# Patient Record
Sex: MALE | Race: WHITE | Employment: FULL TIME | URBAN - METROPOLITAN AREA
[De-identification: names, ages, dates, MRNs, and addresses within clinical notes are randomized per-mention and may not be internally consistent; named-entity substitution may affect disease eponyms.]

---

## 2019-06-13 ENCOUNTER — HOSPITAL ENCOUNTER (OUTPATIENT)
Dept: LAB | Age: 59
Discharge: HOME | End: 2019-06-13
Payer: SELF-PAY

## 2019-06-13 ENCOUNTER — OFFICE VISIT (OUTPATIENT)
Dept: FAMILY MEDICINE CLINIC | Age: 59
End: 2019-06-13

## 2019-06-13 VITALS
HEIGHT: 67 IN | WEIGHT: 216.6 LBS | RESPIRATION RATE: 16 BRPM | DIASTOLIC BLOOD PRESSURE: 84 MMHG | SYSTOLIC BLOOD PRESSURE: 116 MMHG | HEART RATE: 67 BPM | OXYGEN SATURATION: 97 % | BODY MASS INDEX: 34 KG/M2 | TEMPERATURE: 98.1 F

## 2019-06-13 DIAGNOSIS — R06.02 SOB (SHORTNESS OF BREATH) ON EXERTION: ICD-10-CM

## 2019-06-13 DIAGNOSIS — F17.200 SMOKER: ICD-10-CM

## 2019-06-13 DIAGNOSIS — R06.02 SOB (SHORTNESS OF BREATH) ON EXERTION: Primary | ICD-10-CM

## 2019-06-13 LAB
BASOPHILS # BLD: 0 K/UL (ref 0–0.1)
BASOPHILS NFR BLD: 0 % (ref 0–3)
DIFFERENTIAL METHOD BLD: ABNORMAL
EOSINOPHIL # BLD: 0.3 K/UL (ref 0–0.4)
EOSINOPHIL NFR BLD: 2 % (ref 0–5)
ERYTHROCYTE [DISTWIDTH] IN BLOOD BY AUTOMATED COUNT: 13.7 % (ref 11.6–14.5)
HCT VFR BLD AUTO: 46.8 % (ref 36–48)
HGB BLD-MCNC: 15.2 G/DL (ref 13–16)
LYMPHOCYTES # BLD: 3 K/UL (ref 0.8–3.5)
LYMPHOCYTES NFR BLD: 22 % (ref 20–51)
MCH RBC QN AUTO: 29.2 PG (ref 24–34)
MCHC RBC AUTO-ENTMCNC: 32.5 G/DL (ref 31–37)
MCV RBC AUTO: 90 FL (ref 74–97)
MONOCYTES # BLD: 0.9 K/UL (ref 0–1)
MONOCYTES NFR BLD: 7 % (ref 2–9)
NEUTS BAND NFR BLD MANUAL: 8 % (ref 0–5)
NEUTS SEG # BLD: 8.2 K/UL (ref 1.8–8)
NEUTS SEG NFR BLD: 61 % (ref 42–75)
PLATELET # BLD AUTO: 297 K/UL (ref 135–420)
PLATELET COMMENTS,PCOM: ADEQUATE
PMV BLD AUTO: 10.4 FL (ref 9.2–11.8)
RBC # BLD AUTO: 5.2 M/UL (ref 4.7–5.5)
RBC MORPH BLD: ABNORMAL
WBC # BLD AUTO: 13.5 K/UL (ref 4.6–13.2)

## 2019-06-13 PROCEDURE — 80061 LIPID PANEL: CPT

## 2019-06-13 PROCEDURE — 80053 COMPREHEN METABOLIC PANEL: CPT

## 2019-06-13 PROCEDURE — 85025 COMPLETE CBC W/AUTO DIFF WBC: CPT

## 2019-06-13 RX ORDER — FLUTICASONE PROPIONATE 220 UG/1
1 AEROSOL, METERED RESPIRATORY (INHALATION)
COMMUNITY
Start: 2019-05-28

## 2019-06-13 RX ORDER — ALBUTEROL SULFATE 90 UG/1
2 AEROSOL, METERED RESPIRATORY (INHALATION)
COMMUNITY
Start: 2019-01-08

## 2019-06-13 NOTE — PROGRESS NOTES
Patient states he works in [employer] sometimes and has noticed he always has breathing issues when he is there. He developes SOB.

## 2019-06-13 NOTE — PROGRESS NOTES
Bal Wiley is a 60 y.o. male  presents for follow up from [place]. He had SOB and was told to follow up with PCP. He quit smoking and since he returned home from [facility] he has no SOB. No Known Allergies  Outpatient Medications Marked as Taking for the 6/13/19 encounter (Office Visit) with Gilberto Smith MD   Medication Sig Dispense Refill    albuterol (PROVENTIL HFA, VENTOLIN HFA, PROAIR HFA) 90 mcg/actuation inhaler Take 2 Puffs by inhalation.  fluticasone propion/salmeterol (ADVAIR DISKUS IN) Take  by inhalation. 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    Elevated blood sugar R73.9    Tobacco use disorder F17.200    DDD (degenerative disc disease), lumbar M51.36    Bulging lumbar disc M51.26    Chronic back pain M54.9, G89.29    Muscle spasm M62.838    Lumbar foraminal stenosis M99.83    Bilateral sciatica M54.31, M54.32    Hyperinflation of lungs, CXR 1/2014 R09.89    Chronic lumbar pain M54.5, G89.29    Facet arthritis of lumbar region M47.816    Midline low back pain without sciatica M54.5    Encounter for long-term (current) use of medications Z79.899    Chronic pain G89.29    Lumbar degenerative disc disease M51.36    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        Social History     Socioeconomic History    Marital status:      Spouse name: Not on file    Number of children: Not on file    Years of education: Not on file    Highest education level: Not on file   Tobacco Use    Smoking status: Former Smoker     Packs/day: 1.00     Types: Cigarettes    Smokeless tobacco: Never Used   Substance and Sexual Activity    Alcohol use: No    Drug use: No     Family History   Problem Relation Age of Onset    Heart Attack Mother         MI    Coronary Artery Disease Other     Psychiatric Disorder Sister 68        alzheimer's    Arthritis-rheumatoid Maternal Uncle         2 uncles with disabling arthrits        Review of Systems   Constitutional: Negative for chills, fever, malaise/fatigue and weight loss. Eyes: Negative for blurred vision. Respiratory: Negative for shortness of breath and wheezing. Cardiovascular: Negative for chest pain. Gastrointestinal: Negative for nausea and vomiting. Musculoskeletal: Negative for myalgias. Skin: Negative for rash. Neurological: Negative for weakness. Vitals:    06/13/19 0825   BP: 116/84   Pulse: 67   Resp: 16   Temp: 98.1 °F (36.7 °C)   TempSrc: Oral   SpO2: 97%   Weight: 216 lb 9.6 oz (98.2 kg)   Height: 5' 7\" (1.702 m)   PainSc:   0 - No pain       Physical Exam   Constitutional: He is oriented to person, place, and time and well-developed, well-nourished, and in no distress. Neck: Normal range of motion. Neck supple. No thyromegaly present. Cardiovascular: Normal rate, regular rhythm and normal heart sounds. Pulmonary/Chest: Effort normal and breath sounds normal.   Musculoskeletal: Normal range of motion. Neurological: He is alert and oriented to person, place, and time. Gait normal.   Skin: Skin is warm and dry. Psychiatric: Mood, memory, affect and judgment normal.   Nursing note and vitals reviewed. Assessment/Plan      ICD-10-CM ICD-9-CM    1.  SOB (shortness of breath) on exertion R06.02 786.05 REFERRAL TO PULMONARY DISEASE      METABOLIC PANEL, COMPREHENSIVE      CBC WITH AUTOMATED DIFF      LIPID PANEL   2. Smoker F17.200 305.1      I have discussed the diagnosis with the patient and the intended plan of care as seen in the above orders. The patient has received an after-visit summary and questions were answered concerning future plans. I have discussed medication, side effects, and warnings with the patient in detail. The patient verbalized understanding and is in agreement with the plan of care. The patient will contact the office with any additional concerns. lab results and schedule of future lab studies reviewed with patient    Discussed the patient's BMI with him. The BMI follow up plan is as follows:     dietary management education, guidance, and counseling  encourage exercise  monitor weight  prescribed dietary intake    The patient was counseled on the dangers of tobacco use, and was advised to quit. Reviewed strategies to maximize success, including removing cigarettes and smoking materials from environment. An After Visit Summary was printed and given to the patient.     Enrique Alcantar MD

## 2019-06-14 LAB
ALBUMIN SERPL-MCNC: 3.9 G/DL (ref 3.4–5)
ALBUMIN/GLOB SERPL: 1.4 {RATIO} (ref 0.8–1.7)
ALP SERPL-CCNC: 62 U/L (ref 45–117)
ALT SERPL-CCNC: 45 U/L (ref 16–61)
ANION GAP SERPL CALC-SCNC: 11 MMOL/L (ref 3–18)
AST SERPL-CCNC: 15 U/L (ref 15–37)
BILIRUB SERPL-MCNC: 1.1 MG/DL (ref 0.2–1)
BUN SERPL-MCNC: 19 MG/DL (ref 7–18)
BUN/CREAT SERPL: 22 (ref 12–20)
CALCIUM SERPL-MCNC: 8.9 MG/DL (ref 8.5–10.1)
CHLORIDE SERPL-SCNC: 111 MMOL/L (ref 100–108)
CHOLEST SERPL-MCNC: 187 MG/DL
CO2 SERPL-SCNC: 19 MMOL/L (ref 21–32)
CREAT SERPL-MCNC: 0.87 MG/DL (ref 0.6–1.3)
GLOBULIN SER CALC-MCNC: 2.8 G/DL (ref 2–4)
GLUCOSE SERPL-MCNC: 103 MG/DL (ref 74–99)
HDLC SERPL-MCNC: 82 MG/DL (ref 40–60)
HDLC SERPL: 2.3 {RATIO} (ref 0–5)
LDLC SERPL CALC-MCNC: 90.4 MG/DL (ref 0–100)
LIPID PROFILE,FLP: ABNORMAL
POTASSIUM SERPL-SCNC: 4.7 MMOL/L (ref 3.5–5.5)
PROT SERPL-MCNC: 6.7 G/DL (ref 6.4–8.2)
SODIUM SERPL-SCNC: 141 MMOL/L (ref 136–145)
TRIGL SERPL-MCNC: 73 MG/DL (ref ?–150)
VLDLC SERPL CALC-MCNC: 14.6 MG/DL

## 2019-12-20 ENCOUNTER — APPOINTMENT (OUTPATIENT)
Dept: CT IMAGING | Age: 59
End: 2019-12-20
Attending: EMERGENCY MEDICINE
Payer: SELF-PAY

## 2019-12-20 ENCOUNTER — HOSPITAL ENCOUNTER (EMERGENCY)
Age: 59
Discharge: HOME | End: 2019-12-20
Attending: EMERGENCY MEDICINE
Payer: SELF-PAY

## 2019-12-20 VITALS — DIASTOLIC BLOOD PRESSURE: 68 MMHG | SYSTOLIC BLOOD PRESSURE: 120 MMHG | OXYGEN SATURATION: 97 %
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
## 2019-12-20 PROCEDURE — 70450 CT HEAD/BRAIN W/O DYE: CPT

## 2019-12-20 PROCEDURE — 99284 EMERGENCY DEPT VISIT MOD MDM: CPT

## 2019-12-20 PROCEDURE — 72125 CT NECK SPINE W/O DYE: CPT

## 2019-12-20 PROCEDURE — 72131 CT LUMBAR SPINE W/O DYE: CPT

## 2019-12-20 PROCEDURE — 72128 CT CHEST SPINE W/O DYE: CPT

## 2019-12-20 RX ORDER — LIDOCAINE 4 G/100G
PATCH TOPICAL
Qty: 4 PATCH | Refills: 0 | Status: SHIPPED | OUTPATIENT
Start: 2019-12-20 | End: 2019-12-24

## 2019-12-20 NOTE — ED NOTES
Bedside, Verbal and Written shift change report given to [address] (oncoming nurse) by Nash ANTHONY(offgoing nurse). Report included the following information SBAR, Kardex, and MAR. Hourly rounding and  chart checks completed.
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
## 2019-12-20 NOTE — ED NOTES
Patient for discharge home in no acute distress at this time.  Instructed on signs and symptoms to report to MD. Patient verbalized understanding and left ambulatory with family with no new complaints
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
## 2020-01-07 ENCOUNTER — OFFICE VISIT (OUTPATIENT)
Dept: FAMILY MEDICINE CLINIC | Age: 60
End: 2020-01-07

## 2020-01-07 VITALS
WEIGHT: 216 LBS | BODY MASS INDEX: 33.9 KG/M2 | HEIGHT: 67 IN | DIASTOLIC BLOOD PRESSURE: 78 MMHG | RESPIRATION RATE: 12 BRPM | SYSTOLIC BLOOD PRESSURE: 122 MMHG

## 2020-01-07 DIAGNOSIS — M25.551 PAIN OF RIGHT HIP JOINT: Primary | ICD-10-CM

## 2020-01-07 DIAGNOSIS — M62.838 MUSCLE SPASM: ICD-10-CM

## 2020-01-07 RX ORDER — DICLOFENAC SODIUM 50 MG/1
50 TABLET, DELAYED RELEASE ORAL 2 TIMES DAILY
Qty: 40 TAB | Refills: 1 | Status: SHIPPED | OUTPATIENT
Start: 2020-01-07

## 2020-01-07 RX ORDER — METHOCARBAMOL 500 MG/1
500 TABLET, FILM COATED ORAL 3 TIMES DAILY
Qty: 40 TAB | Refills: 1 | Status: SHIPPED | OUTPATIENT
Start: 2020-01-07
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
## 2020-03-16 ENCOUNTER — TELEPHONE (OUTPATIENT)
Dept: FAMILY MEDICINE CLINIC | Age: 60
End: 2020-03-16

## 2020-03-16 NOTE — TELEPHONE ENCOUNTER
Mr. Rayne Walters came in stating he had his times mixed up. He was supposed to have a CLEM appointment. I offered to reschedule the appointment. The patient said \"don't worry about it\" and walked out.     Dr. Dia Zaldivar is at lunch from 12-1pm.

## 2020-11-04 ENCOUNTER — OFFICE VISIT (OUTPATIENT)
Dept: ORTHOPEDIC SURGERY | Age: 60
End: 2020-11-04
Payer: COMMERCIAL

## 2020-11-04 VITALS
DIASTOLIC BLOOD PRESSURE: 82 MMHG | BODY MASS INDEX: 37.67 KG/M2 | TEMPERATURE: 97.5 F | OXYGEN SATURATION: 97 % | HEIGHT: 67 IN | WEIGHT: 240 LBS | RESPIRATION RATE: 22 BRPM | HEART RATE: 71 BPM | SYSTOLIC BLOOD PRESSURE: 136 MMHG

## 2020-11-04 DIAGNOSIS — G89.29 CHRONIC BACK PAIN, UNSPECIFIED BACK LOCATION, UNSPECIFIED BACK PAIN LATERALITY: ICD-10-CM

## 2020-11-04 DIAGNOSIS — M25.551 CHRONIC RIGHT HIP PAIN: ICD-10-CM

## 2020-11-04 DIAGNOSIS — M54.9 CHRONIC BACK PAIN, UNSPECIFIED BACK LOCATION, UNSPECIFIED BACK PAIN LATERALITY: ICD-10-CM

## 2020-11-04 DIAGNOSIS — G89.29 CHRONIC RIGHT HIP PAIN: ICD-10-CM

## 2020-11-04 DIAGNOSIS — M25.551 RIGHT HIP PAIN: ICD-10-CM

## 2020-11-04 DIAGNOSIS — E66.01 SEVERE OBESITY (HCC): ICD-10-CM

## 2020-11-04 DIAGNOSIS — M16.11 PRIMARY OSTEOARTHRITIS OF RIGHT HIP: Primary | ICD-10-CM

## 2020-11-04 PROCEDURE — 99203 OFFICE O/P NEW LOW 30 MIN: CPT | Performed by: SPECIALIST

## 2020-11-04 PROCEDURE — 73502 X-RAY EXAM HIP UNI 2-3 VIEWS: CPT | Performed by: SPECIALIST
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
## 2020-12-09 NOTE — PATIENT INSTRUCTIONS
Body Mass Index: Care Instructions Your Care Instructions Body mass index (BMI) can help you see if your weight is raising your risk for health problems. It uses a formula to compare how much you weigh with how tall you are. · A BMI lower than 18.5 is considered underweight. · A BMI between 18.5 and 24.9 is considered healthy. · A BMI between 25 and 29.9 is considered overweight. A BMI of 30 or higher is considered obese. If your BMI is in the normal range, it means that you have a lower risk for weight-related health problems. If your BMI is in the overweight or obese range, you may be at increased risk for weight-related health problems, such as high blood pressure, heart disease, stroke, arthritis or joint pain, and diabetes. If your BMI is in the underweight range, you may be at increased risk for health problems such as fatigue, lower protection (immunity) against illness, muscle loss, bone loss, hair loss, and hormone problems. BMI is just one measure of your risk for weight-related health problems. You may be at higher risk for health problems if you are not active, you eat an unhealthy diet, or you drink too much alcohol or use tobacco products. Follow-up care is a key part of your treatment and safety. Be sure to make and go to all appointments, and call your doctor if you are having problems. It's also a good idea to know your test results and keep a list of the medicines you take. How can you care for yourself at home? · Practice healthy eating habits. This includes eating plenty of fruits, vegetables, whole grains, lean protein, and low-fat dairy. · If your doctor recommends it, get more exercise. Walking is a good choice. Bit by bit, increase the amount you walk every day. Try for at least 30 minutes on most days of the week. · Do not smoke. Smoking can increase your risk for health problems.  If you need help quitting, talk to your doctor about stop-smoking programs and Will check labs and call with results    Possible autoimmune condition causing joint stiffness    ------------------------------------------------------  Podiatry at the [facility] or the [facility] - Phone # is [phone],      medicines. These can increase your chances of quitting for good. · Limit alcohol to 2 drinks a day for men and 1 drink a day for women. Too much alcohol can cause health problems. If you have a BMI higher than 25 · Your doctor may do other tests to check your risk for weight-related health problems. This may include measuring the distance around your waist. A waist measurement of more than 40 inches in men or 35 inches in women can increase the risk of weight-related health problems. · Talk with your doctor about steps you can take to stay healthy or improve your health. You may need to make lifestyle changes to lose weight and stay healthy, such as changing your diet and getting regular exercise. If you have a BMI lower than 18.5 · Your doctor may do other tests to check your risk for health problems. · Talk with your doctor about steps you can take to stay healthy or improve your health. You may need to make lifestyle changes to gain or maintain weight and stay healthy, such as getting more healthy foods in your diet and doing exercises to build muscle. Where can you learn more? Go to http://steve-lizzie.info/. Enter S176 in the search box to learn more about \"Body Mass Index: Care Instructions. \" Current as of: October 13, 2016 Content Version: 11.4 © 5000-2033 [employer]wise[employer]. Care instructions adapted under license by [employer] (which disclaims liability or warranty for this information). If you have questions about a medical condition or this instruction, always ask your healthcare professional. [address] any warranty or liability for your use of this information.

## 2022-03-19 PROBLEM — E66.01 SEVERE OBESITY (HCC): Status: ACTIVE | Noted: 2020-11-04

## 2023-04-17 ENCOUNTER — TELEPHONE (OUTPATIENT)
Facility: CLINIC | Age: 63
End: 2023-04-17

## 2023-04-17 NOTE — TELEPHONE ENCOUNTER
----- Message from Jane Arias sent at 4/17/2023  9:07 AM EDT -----  Subject: Message to Provider    QUESTIONS  Information for Provider? Patient called and stated that he was suppose to   start physical therapy at [facility]. Patient stated he   has not heard from anyone and was wondering what to do. Please call  ---------------------------------------------------------------------------  --------------  [facility] SHANIA  [phone]; OK to leave message on voicemail  ---------------------------------------------------------------------------  --------------  SCRIPT ANSWERS  Relationship to Patient?  Self

## 2023-04-17 NOTE — TELEPHONE ENCOUNTER
Patient was referred to [facility] [address] [place] [address], called patient to give their telephone number to schedule no answer telephone number has been left on his VM.

## 2023-04-28 ENCOUNTER — HOSPITAL ENCOUNTER (OUTPATIENT)
Facility: HOSPITAL | Age: 63
Setting detail: RECURRING SERIES
Discharge: HOME | End: 2023-05-01
Payer: COMMERCIAL

## 2023-04-28 PROCEDURE — 97110 THERAPEUTIC EXERCISES: CPT

## 2023-04-28 PROCEDURE — 97161 PT EVAL LOW COMPLEX 20 MIN: CPT

## 2023-04-28 PROCEDURE — 97035 APP MDLTY 1+ULTRASOUND EA 15: CPT

## 2023-04-28 PROCEDURE — 97140 MANUAL THERAPY 1/> REGIONS: CPT

## 2023-05-04 ENCOUNTER — HOSPITAL ENCOUNTER (OUTPATIENT)
Facility: HOSPITAL | Age: 63
Setting detail: RECURRING SERIES
Discharge: HOME | End: 2023-05-07
Payer: COMMERCIAL

## 2023-05-04 PROCEDURE — G0283 ELEC STIM OTHER THAN WOUND: HCPCS

## 2023-05-04 PROCEDURE — 97140 MANUAL THERAPY 1/> REGIONS: CPT

## 2023-05-04 PROCEDURE — 97112 NEUROMUSCULAR REEDUCATION: CPT

## 2023-05-04 PROCEDURE — 97110 THERAPEUTIC EXERCISES: CPT

## 2023-05-04 PROCEDURE — 97530 THERAPEUTIC ACTIVITIES: CPT

## 2023-05-05 ENCOUNTER — OFFICE VISIT (OUTPATIENT)
Facility: CLINIC | Age: 63
End: 2023-05-05

## 2023-05-05 VITALS
OXYGEN SATURATION: 96 % | HEIGHT: 67 IN | BODY MASS INDEX: 33.12 KG/M2 | DIASTOLIC BLOOD PRESSURE: 92 MMHG | WEIGHT: 211 LBS | SYSTOLIC BLOOD PRESSURE: 142 MMHG | TEMPERATURE: 98.3 F | HEART RATE: 107 BPM

## 2023-05-05 DIAGNOSIS — G89.29 CHRONIC MIDLINE THORACIC BACK PAIN: Primary | ICD-10-CM

## 2023-05-05 DIAGNOSIS — M54.6 CHRONIC MIDLINE THORACIC BACK PAIN: Primary | ICD-10-CM

## 2023-05-05 RX ORDER — IPRATROPIUM BROMIDE AND ALBUTEROL SULFATE 2.5; .5 MG/3ML; MG/3ML
SOLUTION RESPIRATORY (INHALATION)
COMMUNITY
Start: 2023-04-19

## 2023-05-05 RX ORDER — PREDNISONE 10 MG/1
TABLET ORAL
COMMUNITY
Start: 2023-04-17

## 2023-05-05 RX ORDER — OXYCODONE HYDROCHLORIDE AND ACETAMINOPHEN 5; 325 MG/1; MG/1
1 TABLET ORAL EVERY 6 HOURS PRN
Qty: 12 TABLET | Refills: 0 | Status: SHIPPED | OUTPATIENT
Start: 2023-05-17 | End: 2023-05-20

## 2023-05-05 ASSESSMENT — PATIENT HEALTH QUESTIONNAIRE - PHQ9
SUM OF ALL RESPONSES TO PHQ9 QUESTIONS 1 & 2: 0
SUM OF ALL RESPONSES TO PHQ QUESTIONS 1-9: 0
2. FEELING DOWN, DEPRESSED OR HOPELESS: 0
SUM OF ALL RESPONSES TO PHQ QUESTIONS 1-9: 0
SUM OF ALL RESPONSES TO PHQ QUESTIONS 1-9: 0
1. LITTLE INTEREST OR PLEASURE IN DOING THINGS: 0
SUM OF ALL RESPONSES TO PHQ QUESTIONS 1-9: 0

## 2023-05-05 NOTE — PROGRESS NOTES
Ramez Miller presents today for   Chief Complaint   Patient presents with    Back Pain       Is someone accompanying this pt? no    Is the patient using any DME equipment during OV? no    Depression Screening:  PHQ-9 Questionaire 5/5/2023 4/11/2023   Little interest or pleasure in doing things 0 2   Feeling down, depressed, or hopeless 0 0   Trouble falling or staying asleep, or sleeping too much - 3   Feeling tired or having little energy - 1   Poor appetite or overeating - 0   Feeling bad about yourself - or that you are a failure or have let yourself or your family down - 0   Trouble concentrating on things, such as reading the newspaper or watching television - 2   Moving or speaking so slowly that other people could have noticed. Or the opposite - being so fidgety or restless that you have been moving around a lot more than usual - 0   Thoughts that you would be better off dead, or of hurting yourself in some way - 0   PHQ-9 Total Score 0 8   If you checked off any problems, how difficult have these problems made it for you to do your work, take care of things at home, or get along with other people? - 1        LISSA 7-Anxiety   No flowsheet data found. Learning Assessment:  No question data found. Fall Risk  No flowsheet data found. Travel Screening:    Travel Screening       Question Response    In the last 10 days, have you been in contact with someone who was confirmed or suspected to have Coronavirus/COVID-19? --    Have you had a COVID-19 viral test in the last 10 days? No    Do you have any of the following new or worsening symptoms? None of these    Have you traveled internationally or domestically in the last month? No          Travel History   Travel since 04/05/23    No documented travel since 04/05/23          Health Maintenance reviewed and discussed and ordered per Provider.   Social Determinants of Health     Tobacco Use: Medium Risk    Smoking Tobacco Use: Former    Smokeless

## 2023-05-05 NOTE — PROGRESS NOTES
Madeleine Roche (: 1960) is a 60 y.o. male, established patient, here for evaluation of the following chief complaint(s):  Back Pain       Assessment/Plan:  1. Chronic midline thoracic back pain- Continue with PT, tylenol and aleve. Now chronic in nature still in exacerbation. Getting established with Spine given the prolonged nature of his back pain. Small refill of Oxy dated for  to help him get through his daughters wedding that weekend with better functionality.  reviewed and appropriate. Risks and benefits discussed. -     oxyCODONE-acetaminophen (PERCOCET) 5-325 MG per tablet; Take 1 tablet by mouth every 6 hours as needed for Pain for up to 3 days. Intended supply: 3 days. Take lowest dose possible to manage pain Max Daily Amount: 4 tablets, Disp-12 tablet, R-0Normal  -     [address] [facility], [employer] ([facility])      Return in about 2 months (around 2023) for Routine physical.      Subjective/Objective:  HPI    Back pain- Minimal improvement over the last 4 weeks. Doing PT, hoping for the best. Wants to get next step plans established. Of note, pt's daughter is getting  later this month. He is requesting small refill of Oxy 5 the day prior to the wedding to take the edge off and allow him better functionality to participate with the wedding. Meds attempted: Tylenol, aleve    Physical Exam  Blood pressure (!) 142/92, pulse (!) 107, temperature 98.3 °F (36.8 °C), temperature source Temporal, height 5' 7\" (1.702 m), weight 211 lb (95.7 kg), SpO2 96 %. Body mass index is 33.05 kg/m². General appearance - Alert, NAD, normal appearance. Head - Atraumatic. Normocephalic. Eyes - EOMI. Sclera white. Respiratory - Pulmonary effort is normal. No respiratory distress. Neurological - No gross focal deficits.  Speech normal.   Psychiatric - Mood normal. Behavior normal.     Medical History- Reviewed Social History- Reviewed Surgical

## 2023-05-09 ENCOUNTER — HOSPITAL ENCOUNTER (OUTPATIENT)
Facility: HOSPITAL | Age: 63
Setting detail: RECURRING SERIES
Discharge: HOME | End: 2023-05-12
Payer: COMMERCIAL

## 2023-05-09 PROCEDURE — 97112 NEUROMUSCULAR REEDUCATION: CPT

## 2023-05-09 PROCEDURE — 97530 THERAPEUTIC ACTIVITIES: CPT

## 2023-05-09 PROCEDURE — 97110 THERAPEUTIC EXERCISES: CPT

## 2023-05-09 NOTE — PROGRESS NOTES
Status:  Decreased ability to sleep with 4-5 hrs of sleep loss each night             Current Status:  3. Pt will report decreased discomfort with performing usual daily activities for carryover to performing his usual daily activities and return to normal work tasks                Eval Status:  Unable to do normal work tasks             Current Status:  4. Pt will have decreased pain at the thoracic spine at 3/10 or better to aid with increased daily activity with tolerable discomfort levels               Eval Status:  Pain 8/10             Current Status:  5.   Pt will improve FOTO score to 62 in 11 visits to show significant improvement in function for progress to performing usual daily activity             Eval Status: FOTO 40             Current Status:    PLAN  Yes  Continue plan of care  []  Upgrade activities as tolerated  []  Discharge due to :  []  Other:    Madeleine Tamez PTA    5/9/2023    2:03 PM    Future Appointments   Date Time Provider Miguel A Jasso   5/11/2023  2:40 PM Madeleine Tamez PTA [facility]   5/16/2023  5:20 PM Prashanth Pipmargo, PT [facility]   5/30/2023  4:40 PM Prashanth Pipyolandas, PT [facility]   6/1/2023  4:40 PM Prashanth Pipyolandas, PT [facility]   7/5/2023  1:15 PM Jemima Pak MD [facility]

## 2023-05-11 ENCOUNTER — HOSPITAL ENCOUNTER (OUTPATIENT)
Facility: HOSPITAL | Age: 63
Setting detail: RECURRING SERIES
Discharge: HOME | End: 2023-05-14
Payer: COMMERCIAL

## 2023-05-11 PROCEDURE — 97110 THERAPEUTIC EXERCISES: CPT

## 2023-05-11 PROCEDURE — 97530 THERAPEUTIC ACTIVITIES: CPT

## 2023-05-11 PROCEDURE — 97112 NEUROMUSCULAR REEDUCATION: CPT

## 2023-05-24 ENCOUNTER — OFFICE VISIT (OUTPATIENT)
Age: 63
End: 2023-05-24

## 2023-05-24 VITALS
HEART RATE: 88 BPM | TEMPERATURE: 98.3 F | BODY MASS INDEX: 33.71 KG/M2 | OXYGEN SATURATION: 96 % | HEIGHT: 67 IN | WEIGHT: 214.8 LBS

## 2023-05-24 DIAGNOSIS — M54.9 BACK PAIN, UNSPECIFIED BACK LOCATION, UNSPECIFIED BACK PAIN LATERALITY, UNSPECIFIED CHRONICITY: Primary | ICD-10-CM

## 2023-05-24 DIAGNOSIS — M47.816 LUMBAR FACET ARTHROPATHY: ICD-10-CM

## 2023-05-24 DIAGNOSIS — M62.838 MUSCLE SPASM: ICD-10-CM

## 2023-05-24 DIAGNOSIS — M54.50 ACUTE MIDLINE LOW BACK PAIN WITHOUT SCIATICA: ICD-10-CM

## 2023-05-24 RX ORDER — KETOROLAC TROMETHAMINE 30 MG/ML
60 INJECTION, SOLUTION INTRAMUSCULAR; INTRAVENOUS ONCE
Status: COMPLETED | OUTPATIENT
Start: 2023-05-24 | End: 2023-05-24

## 2023-05-24 RX ORDER — CYCLOBENZAPRINE HCL 10 MG
10 TABLET ORAL 3 TIMES DAILY PRN
Qty: 30 TABLET | Refills: 0 | Status: SHIPPED | OUTPATIENT
Start: 2023-05-24 | End: 2023-06-03

## 2023-05-24 RX ORDER — DICLOFENAC SODIUM 75 MG/1
75 TABLET, DELAYED RELEASE ORAL 2 TIMES DAILY
Qty: 60 TABLET | Refills: 3 | Status: SHIPPED | OUTPATIENT
Start: 2023-05-24

## 2023-05-24 RX ADMIN — KETOROLAC TROMETHAMINE 60 MG: 30 INJECTION, SOLUTION INTRAMUSCULAR; INTRAVENOUS at 11:18

## 2023-05-24 ASSESSMENT — PATIENT HEALTH QUESTIONNAIRE - PHQ9
SUM OF ALL RESPONSES TO PHQ QUESTIONS 1-9: 0
1. LITTLE INTEREST OR PLEASURE IN DOING THINGS: 0
SUM OF ALL RESPONSES TO PHQ9 QUESTIONS 1 & 2: 0
2. FEELING DOWN, DEPRESSED OR HOPELESS: 0

## 2023-05-24 NOTE — PROGRESS NOTES
Consent was explained to the pt and signed. No questions or concerns voiced at this time. Pt given 60 mg/2 ml of toradol IM in right gluteus. No sign or symptoms of infection noted at injection site. There was no bleeding, swelling or leaking noted after injection. Pt handed injection information sheet to take home. Patient walked to the  without any issues.
Yaya Kingsburg presents today for   Chief Complaint   Patient presents with    Back Pain       Is someone accompanying this pt? no    Is the patient using any DME equipment during OV? no    Depression Screening:  No flowsheet data found. Learning Assessment:  No flowsheet data found. Abuse Screening:  No flowsheet data found. Fall Risk  No flowsheet data found. OPIOID RISK TOOL  No flowsheet data found. Coordination of Care:  1. Have you been to the ER, urgent care clinic since your last visit? no  Hospitalized since your last visit? no    2. Have you seen or consulted any other health care providers outside of the [address] [place] [address] since your last visit? no Include any pap smears or colon screening.  no
in no acute distress. HEENT: Normocephalic. Atraumatic. Oropharynx is moist and clear. PERRL. EOMI. Sclerae are nonicteric  Cardiovascular: Regular rate and rhythm  Lungs: Clear to auscultation bilaterally  Abdomen: Soft and nontender. Bowel sounds are present  Neurological: 1+ symmetrical DTRs in the upper extremities. 1+ symmetrical DTRs in the lower extremities. Sensation to light touch is intact. Negative Carmona's sign bilaterally. Skin: warm, dry, and intact. Musculoskeletal:  Moderate pain with extension, axial loading, and less with forward flexion. No pain with internal or external rotation of his hips. Negative straight leg raise bilaterally. No pain with heel or toe walking. No difficulty with the single leg stance bilaterally. Biceps  Triceps Deltoids Wrist Ext Wrist Flex Hand Intrin   Right +4/5 +4/5 +4/5 +4/5 +4/5 +4/5   Left +4/5 +4/5 +4/5 +4/5 +4/5 +4/5      Hip Flex  Quads Hamstrings Ankle DF EHL Ankle PF   Right +4/5 +4/5 +4/5 +4/5 +4/5 +4/5   Left +4/5 +4/5 +4/5 +4/5 +4/5 +4/5     Administrations This Visit       ketorolac (TORADOL) injection 60 mg       Admin Date  05/24/2023  11:18 Action  Given Dose  60 mg Route  IntraMUSCular Site  Other Administered By  Joanne Leung MA    Ordering Provider: Sahra Hollis MD    NDC: 99275-132-49    : AL[facility]    Patient Supplied?: No    Comments: right gluteal                     IMAGING:  Lumbar 4V x-rays 05/24/2023 were personally reviewed with the pt and demonstrated:  Multi-level degenerative facets, good alignment, no instability, atherosclerosis, DJD both hips    Lumbar MRI 08/22/2015 was personally reviewed with the pt and demonstrated:  Procedure: MRI of the lumbar spine without contrast.     CPT code: 53047     Comparisons: None.      Indications:  Low back pain, difficulty with ambulation         Technique: T1 weighted, T2 FSE with fat saturation, FSE inversion recovery   sagittal images are supplemented by T2 weighted

## 2025-03-11 ENCOUNTER — OFFICE VISIT (OUTPATIENT)
Facility: CLINIC | Age: 65
End: 2025-03-11
Payer: COMMERCIAL

## 2025-03-11 VITALS
DIASTOLIC BLOOD PRESSURE: 100 MMHG | OXYGEN SATURATION: 95 % | BODY MASS INDEX: 31.11 KG/M2 | SYSTOLIC BLOOD PRESSURE: 160 MMHG | TEMPERATURE: 99.4 F | HEIGHT: 67 IN | WEIGHT: 198.25 LBS | HEART RATE: 81 BPM | RESPIRATION RATE: 16 BRPM

## 2025-03-11 DIAGNOSIS — M54.16 LUMBAR RADICULOPATHY: ICD-10-CM

## 2025-03-11 DIAGNOSIS — S32.018G OTHER CLOSED FRACTURE OF FIRST LUMBAR VERTEBRA WITH DELAYED HEALING, SUBSEQUENT ENCOUNTER: Primary | ICD-10-CM

## 2025-03-11 PROCEDURE — 99214 OFFICE O/P EST MOD 30 MIN: CPT | Performed by: STUDENT IN AN ORGANIZED HEALTH CARE EDUCATION/TRAINING PROGRAM

## 2025-03-11 PROCEDURE — 1123F ACP DISCUSS/DSCN MKR DOCD: CPT | Performed by: STUDENT IN AN ORGANIZED HEALTH CARE EDUCATION/TRAINING PROGRAM

## 2025-03-11 RX ORDER — PREDNISONE 10 MG/1
TABLET ORAL
COMMUNITY
End: 2025-03-11

## 2025-03-11 RX ORDER — NAPROXEN 500 MG/1
500 TABLET ORAL 2 TIMES DAILY
COMMUNITY

## 2025-03-11 RX ORDER — CYCLOBENZAPRINE HCL 10 MG
10 TABLET ORAL 3 TIMES DAILY
COMMUNITY
Start: 2025-03-07

## 2025-03-11 RX ORDER — OXYCODONE AND ACETAMINOPHEN 5; 325 MG/1; MG/1
1 TABLET ORAL 2 TIMES DAILY PRN
Qty: 28 TABLET | Refills: 0 | Status: SHIPPED | OUTPATIENT
Start: 2025-03-11 | End: 2025-04-01

## 2025-03-11 SDOH — ECONOMIC STABILITY: FOOD INSECURITY: WITHIN THE PAST 12 MONTHS, THE FOOD YOU BOUGHT JUST DIDN'T LAST AND YOU DIDN'T HAVE MONEY TO GET MORE.: PATIENT DECLINED

## 2025-03-11 SDOH — ECONOMIC STABILITY: FOOD INSECURITY: WITHIN THE PAST 12 MONTHS, YOU WORRIED THAT YOUR FOOD WOULD RUN OUT BEFORE YOU GOT MONEY TO BUY MORE.: PATIENT DECLINED

## 2025-03-11 ASSESSMENT — PATIENT HEALTH QUESTIONNAIRE - PHQ9
SUM OF ALL RESPONSES TO PHQ QUESTIONS 1-9: 15
SUM OF ALL RESPONSES TO PHQ QUESTIONS 1-9: 15
1. LITTLE INTEREST OR PLEASURE IN DOING THINGS: NEARLY EVERY DAY
6. FEELING BAD ABOUT YOURSELF - OR THAT YOU ARE A FAILURE OR HAVE LET YOURSELF OR YOUR FAMILY DOWN: NEARLY EVERY DAY
8. MOVING OR SPEAKING SO SLOWLY THAT OTHER PEOPLE COULD HAVE NOTICED. OR THE OPPOSITE, BEING SO FIGETY OR RESTLESS THAT YOU HAVE BEEN MOVING AROUND A LOT MORE THAN USUAL: NEARLY EVERY DAY
SUM OF ALL RESPONSES TO PHQ QUESTIONS 1-9: 15
3. TROUBLE FALLING OR STAYING ASLEEP: NEARLY EVERY DAY
SUM OF ALL RESPONSES TO PHQ QUESTIONS 1-9: 15
7. TROUBLE CONCENTRATING ON THINGS, SUCH AS READING THE NEWSPAPER OR WATCHING TELEVISION: NOT AT ALL
2. FEELING DOWN, DEPRESSED OR HOPELESS: NEARLY EVERY DAY
9. THOUGHTS THAT YOU WOULD BE BETTER OFF DEAD, OR OF HURTING YOURSELF: NOT AT ALL
5. POOR APPETITE OR OVEREATING: NOT AT ALL
10. IF YOU CHECKED OFF ANY PROBLEMS, HOW DIFFICULT HAVE THESE PROBLEMS MADE IT FOR YOU TO DO YOUR WORK, TAKE CARE OF THINGS AT HOME, OR GET ALONG WITH OTHER PEOPLE: SOMEWHAT DIFFICULT
4. FEELING TIRED OR HAVING LITTLE ENERGY: NOT AT ALL

## 2025-03-11 NOTE — PROGRESS NOTES
\"Have you been to the ER, urgent care clinic since your last visit?  Hospitalized since your last visit?\"    YES - When: approximately 1 months ago.  Where and Why: back pain.    “Have you seen or consulted any other health care providers outside our system since your last visit?”    NO      “Have you had a colorectal cancer screening such as a colonoscopy/FIT/Cologuard?    NO    No colonoscopy on file  No cologuard on file  No FIT/FOBT on file   No flexible sigmoidoscopy on file            
Daily Amount: 2 tablets, Disp-28 tablet, R-0Normal  -     naloxone 4 MG/0.1ML LIQD nasal spray; 1 spray by Nasal route as needed for Opioid Reversal, Disp-2 each, R-0Normal    Return for your previously scheduled next visit.         Subjective   History of Present Illness  The patient presents for evaluation of back pain.    He has been under the care of an orthopedic specialist who initially prescribed medication and ordered an MRI last week. Due to the physician's unavailability, he was advised to consult his primary care provider. He has a history of 2 previous MRIs but has experienced a delay in obtaining the results this time. His current supply of pain medication is limited to 2 tablets, and he has occasionally taken 2 tablets at once. He has bilateral lower back pain radiating to his knees, necessitating the use of crutches as he cannot bear full weight on either leg. He takes oxycodone 5 mg tablets 3 times daily. His sleep pattern is irregular, typically retiring between 9:30 PM and 10:00 PM and waking up around 2:30 AM. He has difficulty finding a comfortable sleeping position and has had to crawl to the bathroom due to severe knee pain. He has a follow-up appointment with his orthopedic specialist on 03/21/2025.    Supplemental Information  He continues to see his pulmonologist for COPD every 6 to 7 months and gets a free x-ray once a year.    SOCIAL HISTORY  - Works as a .  Typically low-dose work but recently has been more on his feet at projects  - Lives with wife and grandchildren           Objective   Blood pressure (!) 160/100, pulse 81, temperature 99.4 °F (37.4 °C), temperature source Tympanic, resp. rate 16, height 1.702 m (5' 7\"), weight 89.9 kg (198 lb 4 oz), SpO2 95%.Body mass index is 31.05 kg/m².  General appearance - Alert, NAD, normal appearance.  Standing during visit leaning over on crutches  Head - Atraumatic. Normocephalic.   Eyes - EOMI. Sclera white.   Respiratory -

## 2025-05-01 ENCOUNTER — HOSPITAL ENCOUNTER (OUTPATIENT)
Facility: HOSPITAL | Age: 65
Setting detail: SPECIMEN
Discharge: HOME | End: 2025-05-01
Payer: COMMERCIAL

## 2025-05-01 ENCOUNTER — OFFICE VISIT (OUTPATIENT)
Facility: CLINIC | Age: 65
End: 2025-05-01
Payer: COMMERCIAL

## 2025-05-01 VITALS
HEART RATE: 101 BPM | TEMPERATURE: 98.4 F | HEIGHT: 67 IN | SYSTOLIC BLOOD PRESSURE: 150 MMHG | DIASTOLIC BLOOD PRESSURE: 100 MMHG | OXYGEN SATURATION: 98 % | BODY MASS INDEX: 28.96 KG/M2 | WEIGHT: 184.5 LBS | RESPIRATION RATE: 18 BRPM

## 2025-05-01 DIAGNOSIS — E55.9 VITAMIN D DEFICIENCY: ICD-10-CM

## 2025-05-01 DIAGNOSIS — Z00.00 VISIT FOR WELL MAN HEALTH CHECK: ICD-10-CM

## 2025-05-01 DIAGNOSIS — Z00.00 VISIT FOR WELL MAN HEALTH CHECK: Primary | ICD-10-CM

## 2025-05-01 DIAGNOSIS — S32.018G OTHER CLOSED FRACTURE OF FIRST LUMBAR VERTEBRA WITH DELAYED HEALING, SUBSEQUENT ENCOUNTER: ICD-10-CM

## 2025-05-01 DIAGNOSIS — R73.03 PREDIABETES: ICD-10-CM

## 2025-05-01 DIAGNOSIS — Z12.12 ENCOUNTER FOR COLORECTAL CANCER SCREENING: ICD-10-CM

## 2025-05-01 DIAGNOSIS — Z12.11 ENCOUNTER FOR COLORECTAL CANCER SCREENING: ICD-10-CM

## 2025-05-01 PROCEDURE — 80061 LIPID PANEL: CPT

## 2025-05-01 PROCEDURE — 80053 COMPREHEN METABOLIC PANEL: CPT

## 2025-05-01 PROCEDURE — 83036 HEMOGLOBIN GLYCOSYLATED A1C: CPT

## 2025-05-01 PROCEDURE — 99397 PER PM REEVAL EST PAT 65+ YR: CPT | Performed by: STUDENT IN AN ORGANIZED HEALTH CARE EDUCATION/TRAINING PROGRAM

## 2025-05-01 PROCEDURE — 99214 OFFICE O/P EST MOD 30 MIN: CPT | Performed by: STUDENT IN AN ORGANIZED HEALTH CARE EDUCATION/TRAINING PROGRAM

## 2025-05-01 PROCEDURE — 82306 VITAMIN D 25 HYDROXY: CPT

## 2025-05-01 RX ORDER — GABAPENTIN 300 MG/1
300 CAPSULE ORAL 2 TIMES DAILY
COMMUNITY

## 2025-05-01 RX ORDER — OXYCODONE HYDROCHLORIDE 5 MG/1
5 TABLET ORAL EVERY 8 HOURS PRN
COMMUNITY

## 2025-05-01 NOTE — PATIENT INSTRUCTIONS
To schedule appointments for CT, MRI, Ultrasound, Vascular Ultrasound, Mammography, Bone Density, and X-ray, please call Central Scheduling at [phone].

## 2025-05-01 NOTE — PROGRESS NOTES
Joseph Carlos (: 1960) is a 64 y.o. male, established patient, here for evaluation of the following chief complaint(s):  Annual Exam (No concerns. )        Assessment & Plan  1. Bilateral pulmonary emboli: Acute and extensive. Numerous central filling defects in every lobar and segmental pulmonary artery, most extensive burden in the left lower lobe.  - Continue Eliquis for at least 3 months, reassess treatment duration based on clot provocation.  - Symptom improvement since starting Eliquis.  - Continue follow-up with pulmonology    2. Left lower extremity DVT: Acute. Clots from common femoral vein to posterior tibial vein in left leg.  - Current treatment with Eliquis.  - Possible swelling in left leg expected.    3. Osteopenia: Diffuse. History of spinal compression fractures and diffuse osteopenia on CT scan.  - Order DEXA scan to assess bone density and guide treatment for potential osteoporosis.    4. Health maintenance/physical.  - Order Cologuard test for colon cancer screening.  - Order blood work for cholesterol, blood sugar, liver function, and vitamin D levels.  - Strongly encourage shingles and pneumonia vaccines.  - Discussed age appropriate well man screenings and preventative care.    Follow-up  - Follow-up with pulmonologist next Thursday.    Results  - Imaging:    - CT scan of lungs:      - Numerous central filling defects in lobar and segmental pulmonary arteries, most extensive in left lower lobe      - Acute and extensive bilateral pulmonary emboli    - CT scan:      - Diffuse osteopenia      - Thin bones  1. Visit for well man health check  -     Hemoglobin A1C; Future  -     Comprehensive Metabolic Panel; Future  -     Lipid Panel; Future  -     Cologuard (Fecal DNA Colorectal Cancer Screening)  -     Vitamin D 25 Hydroxy; Future  2. Encounter for colorectal cancer screening  -     Cologuard (Fecal DNA Colorectal Cancer Screening)  3. Prediabetes  4. Vitamin D deficiency  -

## 2025-05-01 NOTE — PROGRESS NOTES
Have you been to the ER, urgent care clinic since your last visit?  Hospitalized since your last visit?   YES - When: approximately 3 days ago.  Where and Why: [facility] pulmonary embolism.    Have you seen or consulted any other health care providers outside our system since your last visit?   YES - When: approximately 3 days ago.  [facility] .    “Have you had a colorectal cancer screening such as a colonoscopy/FIT/Cologuard?    NO    No colonoscopy on file  No cologuard on file  No FIT/FOBT on file   No flexible sigmoidoscopy on file

## 2025-05-02 LAB
25(OH)D3 SERPL-MCNC: 27.2 NG/ML (ref 30–100)
ALBUMIN SERPL-MCNC: 3.4 G/DL (ref 3.4–5)
ALBUMIN/GLOB SERPL: 1.3 (ref 0.8–1.7)
ALP SERPL-CCNC: 94 U/L (ref 45–117)
ALT SERPL-CCNC: 35 U/L (ref 10–50)
ANION GAP SERPL CALC-SCNC: 11 MMOL/L (ref 3–18)
AST SERPL-CCNC: 21 U/L (ref 10–38)
BILIRUB SERPL-MCNC: 0.3 MG/DL (ref 0.2–1)
BUN SERPL-MCNC: 22 MG/DL (ref 6–23)
BUN/CREAT SERPL: 25 (ref 12–20)
CALCIUM SERPL-MCNC: 9.5 MG/DL (ref 8.5–10.1)
CHLORIDE SERPL-SCNC: 104 MMOL/L (ref 98–107)
CHOLEST SERPL-MCNC: 192 MG/DL
CO2 SERPL-SCNC: 24 MMOL/L (ref 21–32)
CREAT SERPL-MCNC: 0.89 MG/DL (ref 0.6–1.3)
EST. AVERAGE GLUCOSE BLD GHB EST-MCNC: 122 MG/DL
GLOBULIN SER CALC-MCNC: 2.5 G/DL (ref 2–4)
GLUCOSE SERPL-MCNC: 93 MG/DL (ref 74–108)
HBA1C MFR BLD: 5.9 % (ref 4.2–5.6)
HDLC SERPL-MCNC: 79 MG/DL (ref 40–60)
HDLC SERPL: 2.4 (ref 0–5)
LDLC SERPL CALC-MCNC: 99 MG/DL (ref 0–100)
POTASSIUM SERPL-SCNC: 4.6 MMOL/L (ref 3.5–5.5)
PROT SERPL-MCNC: 6 G/DL (ref 6.4–8.2)
SODIUM SERPL-SCNC: 138 MMOL/L (ref 136–145)
TRIGL SERPL-MCNC: 72 MG/DL (ref 0–150)
VLDLC SERPL CALC-MCNC: 14 MG/DL

## 2025-05-04 ENCOUNTER — RESULTS FOLLOW-UP (OUTPATIENT)
Facility: CLINIC | Age: 65
End: 2025-05-04

## 2025-06-11 ENCOUNTER — COMMUNITY OUTREACH (OUTPATIENT)
Facility: CLINIC | Age: 65
End: 2025-06-11